# Patient Record
Sex: FEMALE | Race: WHITE | Employment: UNEMPLOYED | URBAN - METROPOLITAN AREA
[De-identification: names, ages, dates, MRNs, and addresses within clinical notes are randomized per-mention and may not be internally consistent; named-entity substitution may affect disease eponyms.]

---

## 2022-05-13 ENCOUNTER — HOSPITAL ENCOUNTER (EMERGENCY)
Age: 42
Discharge: HOME | End: 2022-05-13
Attending: EMERGENCY MEDICINE

## 2022-05-13 VITALS
OXYGEN SATURATION: 99 % | BODY MASS INDEX: 46.15 KG/M2 | WEIGHT: 277 LBS | DIASTOLIC BLOOD PRESSURE: 80 MMHG | HEIGHT: 65 IN | RESPIRATION RATE: 16 BRPM | HEART RATE: 76 BPM | SYSTOLIC BLOOD PRESSURE: 133 MMHG | TEMPERATURE: 98.1 F

## 2022-05-13 DIAGNOSIS — H81.392 PERIPHERAL VERTIGO INVOLVING LEFT EAR: Primary | ICD-10-CM

## 2022-05-13 PROCEDURE — 93005 ELECTROCARDIOGRAM TRACING: CPT | Performed by: EMERGENCY MEDICINE

## 2022-05-13 PROCEDURE — 99283 EMERGENCY DEPT VISIT LOW MDM: CPT

## 2022-05-13 PROCEDURE — 6370000000 HC RX 637 (ALT 250 FOR IP): Performed by: EMERGENCY MEDICINE

## 2022-05-13 RX ORDER — BUPROPION HYDROCHLORIDE 150 MG/1
150 TABLET, EXTENDED RELEASE ORAL 2 TIMES DAILY
COMMUNITY

## 2022-05-13 RX ORDER — METOCLOPRAMIDE 10 MG/1
10 TABLET ORAL ONCE
Status: COMPLETED | OUTPATIENT
Start: 2022-05-13 | End: 2022-05-13

## 2022-05-13 RX ORDER — METOCLOPRAMIDE 10 MG/1
10 TABLET ORAL 3 TIMES DAILY PRN
Qty: 40 TABLET | Refills: 0 | Status: SHIPPED | OUTPATIENT
Start: 2022-05-13

## 2022-05-13 RX ORDER — AMOXICILLIN AND CLAVULANATE POTASSIUM 875; 125 MG/1; MG/1
1 TABLET, FILM COATED ORAL 2 TIMES DAILY
Qty: 14 TABLET | Refills: 0 | Status: SHIPPED | OUTPATIENT
Start: 2022-05-13 | End: 2022-05-20

## 2022-05-13 RX ORDER — CETIRIZINE HYDROCHLORIDE 10 MG/1
10 TABLET ORAL DAILY
Qty: 30 TABLET | Refills: 0 | Status: SHIPPED | OUTPATIENT
Start: 2022-05-13 | End: 2022-06-12

## 2022-05-13 RX ORDER — FUROSEMIDE 20 MG/1
20 TABLET ORAL DAILY
COMMUNITY

## 2022-05-13 RX ORDER — LISINOPRIL AND HYDROCHLOROTHIAZIDE 25; 20 MG/1; MG/1
1 TABLET ORAL DAILY
COMMUNITY

## 2022-05-13 RX ORDER — LEVOTHYROXINE SODIUM 0.2 MG/1
300 TABLET ORAL DAILY
COMMUNITY

## 2022-05-13 RX ADMIN — METOCLOPRAMIDE 10 MG: 10 TABLET ORAL at 21:47

## 2022-05-13 ASSESSMENT — PAIN - FUNCTIONAL ASSESSMENT
PAIN_FUNCTIONAL_ASSESSMENT: NONE - DENIES PAIN
PAIN_FUNCTIONAL_ASSESSMENT: NONE - DENIES PAIN

## 2022-05-14 LAB
EKG ATRIAL RATE: 79 BPM
EKG DIAGNOSIS: NORMAL
EKG P AXIS: 40 DEGREES
EKG P-R INTERVAL: 182 MS
EKG Q-T INTERVAL: 392 MS
EKG QRS DURATION: 112 MS
EKG QTC CALCULATION (BAZETT): 449 MS
EKG R AXIS: 48 DEGREES
EKG T AXIS: 27 DEGREES
EKG VENTRICULAR RATE: 79 BPM

## 2022-05-14 PROCEDURE — 93010 ELECTROCARDIOGRAM REPORT: CPT | Performed by: INTERNAL MEDICINE

## 2022-05-14 NOTE — ED PROVIDER NOTES
mg by mouth 2 times daily    FUROSEMIDE (LASIX) 20 MG TABLET    Take 20 mg by mouth daily    LEVOTHYROXINE (SYNTHROID) 200 MCG TABLET    Take 300 mcg by mouth Daily    LISINOPRIL-HYDROCHLOROTHIAZIDE (PRINZIDE;ZESTORETIC) 20-25 MG PER TABLET    Take 1 tablet by mouth daily Indications: dose is 40/20    METFORMIN (GLUCOPHAGE) 1000 MG TABLET    Take 1,000 mg by mouth 2 times daily (with meals)       Social history:  reports that she has never smoked. She has never used smokeless tobacco. She reports that she does not drink alcohol and does not use drugs. Family history:  History reviewed. No pertinent family history. Exam  ED Triage Vitals [05/13/22 2020]   BP Temp Temp Source Pulse Resp SpO2 Height Weight   (!) 132/90 97.7 °F (36.5 °C) Oral 92 16 100 % 5' 5\" (1.651 m) 277 lb (125.6 kg)     Nursing note and vitals reviewed. Constitutional: In no acute distress  HENT:      Head: Normocephalic      Ears: External ears normal.  Small effusion behind left TM. No erythema or bulging. .     Nose: Nose normal.     Mouth: Membrane mucosa moist   Eyes: No discharge. Neck: Supple. Trachea midline. Cardiovascular: Regular rate. Warm extremities. Clear to auscultation bilaterally. No murmurs  Pulmonary/Chest: Effort normal. No respiratory distress. Abdominal: Soft. No distension. Nontender  : Deferred  Rectal: Deferred   Musculoskeletal: Moves all extremities. No gross deformity. Neurological: Alert and oriented. Face symmetric. Speech is clear. Cranial nerves II to XII intact. Normal finger-to-nose. Normal visual fields. No nystagmus. She does have strabismus. States that this is baseline for her, NIH stroke scale 0. Skin: Warm and dry. Psychiatric: Normal mood and affect. Behavior is normal.    Procedures      EKG  The Ekg interpreted by me in the absence of a cardiologist shows. Normal sinus rhythm. No specific ST changes appreciated.   HR 79  No prior EKG for comparison      Radiology  No orders to display       Labs  Results for orders placed or performed during the hospital encounter of 05/13/22   EKG 12 Lead   Result Value Ref Range    Ventricular Rate 79 BPM    Atrial Rate 79 BPM    P-R Interval 182 ms    QRS Duration 112 ms    Q-T Interval 392 ms    QTc Calculation (Bazett) 449 ms    P Axis 40 degrees    R Axis 48 degrees    T Axis 27 degrees    Diagnosis       Normal sinus rhythmNormal ECGNo previous ECGs available       Screenings   Markell Coma Scale  Eye Opening: Spontaneous  Best Verbal Response: Oriented  Best Motor Response: Obeys commands  Markell Coma Scale Score: 15       MDM and ED Course  This is a 40 y.o. female who presents to the ED for nausea, vomiting, dizziness, tinnitus in the left ear for 1 month. Likely secondary to peripheral vertigo/Ménière's disease. Unlikely central vertigo since symptoms are completely resolving between episodes. NIH stroke scale 0. Ambulating well at this time. Will give Reglan to assist with dizziness. Patient declines to have blood work performed. Discussed benefits and risks of this. doubt that this is cardiogenic with lack of chest discomfort, shortness of breath, or palpitations. Will obtain EKG. No abdominal tenderness or pain, distention, or abnormal bowel movements to indicate appendicitis, cholecystitis, bowel obstruction, gastroenteritis, ureteric calculus, torsion, pelvic inflammatory disease, pancreatitis. Has normal stools and no signs of anemia on my exam.     Since she has a small effusion behind left ear, will empirically treat with antibiotics. Will refer to ENT.         [unfilled]    Is this patient to be included in the SEP-1 Core Measure due to severe sepsis or septic shock? No   Exclusion criteria - the patient is NOT to be included for SEP-1 Core Measure due to: Infection is not suspected        Final Impression  1.  Peripheral vertigo involving left ear        Blood pressure (!) 132/90, pulse 92, temperature 97.7 °F (36.5 °C), temperature source Oral, resp. rate 16, height 5' 5\" (1.651 m), weight 277 lb (125.6 kg), last menstrual period 04/25/2022, SpO2 100 %. Disposition:  DISPOSITION        Patient Referrals: [address] [place] [address]  [phone]  Call in 1 day        Discharge Medications:  New Prescriptions    AMOXICILLIN-CLAVULANATE (AUGMENTIN) 875-125 MG PER TABLET    Take 1 tablet by mouth 2 times daily for 7 days    CETIRIZINE (ZYRTEC) 10 MG TABLET    Take 1 tablet by mouth daily    METOCLOPRAMIDE (REGLAN) 10 MG TABLET    Take 1 tablet by mouth 3 times daily as needed (nausea/dizziness)       Discontinued Medications:  Discontinued Medications    No medications on file       This chart was generated using the [address] [place] [address] [address] dictation system. I created this record but it may contain dictation errors given the limitations of this technology.         Liss Murray MD  05/13/22 7232

## 2022-07-28 ENCOUNTER — PROCEDURE VISIT (OUTPATIENT)
Dept: AUDIOLOGY | Age: 42
End: 2022-07-28

## 2022-07-28 ENCOUNTER — OFFICE VISIT (OUTPATIENT)
Dept: ENT CLINIC | Age: 42
End: 2022-07-28

## 2022-07-28 VITALS — WEIGHT: 265 LBS | HEIGHT: 65 IN | TEMPERATURE: 97 F | RESPIRATION RATE: 16 BRPM | BODY MASS INDEX: 44.15 KG/M2

## 2022-07-28 DIAGNOSIS — H93.12 TINNITUS, LEFT EAR: ICD-10-CM

## 2022-07-28 DIAGNOSIS — H90.42 SENSORINEURAL HEARING LOSS (SNHL) OF LEFT EAR WITH UNRESTRICTED HEARING OF RIGHT EAR: Primary | ICD-10-CM

## 2022-07-28 DIAGNOSIS — R42 VERTIGO: ICD-10-CM

## 2022-07-28 DIAGNOSIS — R42 DIZZINESS AND GIDDINESS: ICD-10-CM

## 2022-07-28 DIAGNOSIS — H90.3 ASNHL (ASYMMETRICAL SENSORINEURAL HEARING LOSS): Primary | ICD-10-CM

## 2022-07-28 PROCEDURE — 99204 OFFICE O/P NEW MOD 45 MIN: CPT | Performed by: STUDENT IN AN ORGANIZED HEALTH CARE EDUCATION/TRAINING PROGRAM

## 2022-07-28 PROCEDURE — 92557 COMPREHENSIVE HEARING TEST: CPT | Performed by: AUDIOLOGIST

## 2022-07-28 PROCEDURE — 69801 INCISE INNER EAR: CPT | Performed by: STUDENT IN AN ORGANIZED HEALTH CARE EDUCATION/TRAINING PROGRAM

## 2022-07-28 PROCEDURE — 92567 TYMPANOMETRY: CPT | Performed by: AUDIOLOGIST

## 2022-07-28 RX ORDER — DEXAMETHASONE SODIUM PHOSPHATE 100 MG/10ML
10 INJECTION INTRAMUSCULAR; INTRAVENOUS ONCE
Status: COMPLETED | OUTPATIENT
Start: 2022-07-28 | End: 2022-07-28

## 2022-07-28 RX ORDER — GLIPIZIDE 10 MG/1
10 TABLET ORAL
COMMUNITY

## 2022-07-28 RX ORDER — ASPIRIN 81 MG/1
81 TABLET ORAL DAILY
COMMUNITY

## 2022-07-28 RX ADMIN — DEXAMETHASONE SODIUM PHOSPHATE 10 MG: 100 INJECTION INTRAMUSCULAR; INTRAVENOUS at 15:43

## 2022-07-28 ASSESSMENT — ENCOUNTER SYMPTOMS
NAUSEA: 0
COUGH: 0
VOMITING: 0
SHORTNESS OF BREATH: 0
EYE PAIN: 0
RHINORRHEA: 0

## 2022-07-28 NOTE — PATIENT INSTRUCTIONS
Good Communication Strategies    Communication can be challenging for anyone, but can be especially difficult for those with some degree of hearing loss. While we may not be able to control every factor that may lead to difficulty with communication, there are Good Communication Strategies that we can all use in our day-to-day lives. Communication takes both parties working together for it to be successful. Tips as a Listener:   Control your environment. It is important to limit the amount of background noise in the room when possible. You should also consider having a good light source in the room to best see the other person. Ask for clarification. Instead of saying \"What?\", you can use parts of what you heard to make a new question. For example, if you heard the word \"Thursday\" but not the rest of the week, you may ask \"What was that about Thursday? \" or \"What did you want to do Thursday? \". This shows the person talking that you are listening and will help them better explain what they are saying. Be an advocate for yourself. If you are hearing but not understanding, tell the other person \"I can hear you, but I need you to slow down when you speak. \"  Or if someone is facing the other direction, say \"I cannot hear you when you are not looking at me when we talk. \"       Tips as a Talker:   - Sit or stand 3 to 6 feet away to maximize audibility         -- It is unrealistic to believe someone else will fully hear your message if you are speaking from across the room or in a different room in the house   - Stay at eye level to help with visual cues   - Make sure you have the persons attention before speaking   - Use facial expressions and gestures to accentuate your message   - Raise your voice slightly (do not scream)   - Speak slowly and distinctly   - Use short, simple sentences   - Rephrase your words if the person is having a hard time understanding you    - To avoid distortion, dont speak directly into a persons ear      Some additional items that may be helpful:   - Remain patient - this is important for both parties   - Write down items that still cannot be heard/understood. You may write with pen/paper or consider typing/texting on a cell phone or smart device. - If background noise is unavoidable, try to keep yourself in a good position in the room. By sitting at a card on the side of the restaurant (preferably a corner), it will be easier to communicate than if you were sitting at a table in the middle with background noise surrounding you. Keep yourself positioned away from music speakers or heavy foot traffic. Tinnitus: Overview and Management Strategies          Many people have some ringing sounds in their ears once in a while. You may hear a roar, a hiss, a tinkle, or a buzz. The sound usually lasts only a few minutes. If it goes on all the time, you may have tinnitus. Tinnitus is usually caused by long-term exposure to loud noise. This damages the nerves in the inner ear. It can occur with all types of hearing loss. It may be a symptom of almost any ear problem. Tinnitus may be caused by a buildup of earwax. Or, it may be caused by ear infections or certain medicines (especially antibiotics or large amounts of aspirin). You can also hear noises in your ears because of an injury to the ears, drinking too much alcohol or caffeine, or a medical condition. Other conditions may also contribute to tinnitus, including: head and neck trauma, temporomandibular joint disorder (TMJ), sinus pressure and barometric trauma, traumatic brain injury, metabolic disorders, autoimmune disorders, stress, and high blood pressure. You may need tests to evaluate your hearing and to find causes of long-lasting tinnitus. Your doctor may suggest one or more treatments to help you cope with the tinnitus. You can also do things at home to help reduce symptoms.     Follow-up care is a key part of your treatment and safety. Be sure to make and go to all appointments, and call your doctor if you are having problems. It's also a good idea to know your test results and keep a list of the medicines you take. How can you care for yourself at home? Limit or cut out alcohol, caffeine, and sodium. They can make your symptoms worse. Do not smoke or use other tobacco products. Nicotine reduces blood flow to the ear and makes tinnitus worse. If you need help quitting, talk to your doctor about stop-smoking programs and medicines. These can increase your chances of quitting for good. Talk to your doctor about whether to stop taking aspirin and similar products such as ibuprofen or naproxen. Get exercise often. It can help improve blood flow to the ear. Ways to manage/cope with tinnitus  Some tinnitus may last a long time. To manage your tinnitus, try to: Avoid noises that you think caused your tinnitus. If you can't avoid loud noises, wear earplugs or earmuffs. Ignore the sound by paying attention to other things. Keeping your brain busy with other tasks or background noise can help your brain not focus on the tinnitus. Try to not give the tinnitus an emotional reaction. Do your best to ignore the sound and not let it bother you. Relax using biofeedback, meditation, or yoga. Feeling stressed and being tired can make tinnitus worse. Play music or white noise to help you sleep. Background noise may cover up the noise that you hear in your ears. You can buy a tabletop machine or a device that sits under your pillow to play soothing sounds, like ocean waves. Smart phones have free apps, such as Whist, Relax Melodies, ReSound Relief, and [employer]. These apps have different types of sounds/noise, some of which you can blend together to find sounds that are most soothing to you.   Hearing aid technology, especially when there is some hearing loss, may help reduce tinnitus symptoms by giving your being around other loud machines. Hearing loss can affect your work and home life. It can make you feel lonely or depressed. You may feel that you have lost your independence. But hearing aids and other devices can help you hear better and feel connected to others. Follow-up care is a key part of your treatment and safety. Be sure to make and go to all appointments, and call your doctor if you are having problems. It's also a good idea to know your test results and keep a list of the medicines you take. How can you care for yourself at home? Avoid loud noises whenever possible. This helps keep your hearing from getting worse. Always wear hearing protection around loud noises. If appropriate, wear hearing aid(s) as directed. It is recommended that hearing aids are worn during all waking hours to keep your brain active and give it access to the sounds it is missing. If you are beginning your process with hearing aid(s), schedule a \"Hearing Aid Evaluation\" with an audiologist to discuss your lifestyle, features of hearing aid technology, and styles of hearing aids available. It is recommended that you contact your insurance company to determine if you have a hearing aid benefit, as this may dictate who you can see for these services. Have hearing tests as your doctor suggests. They can show whether your hearing has changed. Your hearing aid may need to be adjusted. Use other assistive devices as needed. These may include:  Telephone amplifiers and hearing aids that can connect to a television, stereo, radio, or microphone. Devices that use lights or vibrations. These alert you to the doorbell, a ringing telephone, or a baby monitor. Television closed-captioning. This shows the words at the bottom of the screen. Most new TVs can do this. TTY (text telephone). This lets you type messages back and forth on the telephone instead of talking or listening. These devices are also called TDD.  When messages are typed on the keyboard, they are sent over the phone line to a receiving TTY. The message is shown on a monitor. Use pagers, fax machines, text, and email if it is hard for you to communicate by telephone. Try to learn a listening technique called speech-reading. It is not lip-reading. You pay attention to people's gestures, expressions, posture, and tone of voice. These clues can help you understand what a person is saying. Face the person you are talking to, and have him or her face you. Make sure the lighting is good. You need to see the other person's face clearly. Think about counseling if you need help to adjust to your hearing loss. When should you call for help? Watch closely for changes in your health, and be sure to contact your doctor if:    You think your hearing is getting worse. You have new symptoms, such as dizziness or nausea. Dizziness: Care Instructions  Your Care Instructions  Dizziness is the feeling of unsteadiness or fuzziness in your head. It is different than having vertigo, which is a feeling that the room is spinning or that you are moving or falling. It is also different from lightheadedness, which is the feeling that you are about to faint. It can be hard to know what causes dizziness. Some people feel dizzy when they have migraine headaches. Sometimes bouts of flu can make you feel dizzy. Some medical conditions, such as heart problems or high blood pressure, can make you feel dizzy. Many medicines can cause dizziness, including medicines for high blood pressure, pain, or anxiety. If a medicine causes your symptoms, your doctor may recommend that you stop or change the medicine. If it is a problem with your heart, you may need medicine to help your heart work better. If there is no clear reason for your symptoms, your doctor may suggest watching and waiting for a while to see if the dizziness goes away on its own.     Follow-up care is a key part of your treatment and safety. Be sure to make and go to all appointments, and call your doctor if you are having problems. It's also a good idea to know your test results and keep a list of the medicines you take. How can you care for yourself at home? If your doctor recommends or prescribes medicine, take it exactly as directed. Call your doctor if you think you are having a problem with your medicine. Do not drive while you feel dizzy. Try to prevent falls. Steps you can take include:  Using nonskid mats, adding grab bars near the tub, and using night-lights. Clearing your home so that walkways are free of anything you might trip on. Letting family and friends know that you have been feeling dizzy. This will help them know how to help you. When should you call for help? Call 911 anytime you think you may need emergency care. For example, call if:    You passed out (lost consciousness). You have dizziness along with symptoms of a heart attack. These may include:  Chest pain or pressure, or a strange feeling in the chest.  Sweating. Shortness of breath. Nausea or vomiting. Pain, pressure, or a strange feeling in the back, neck, jaw, or upper belly or in one or both shoulders or arms. Lightheadedness or sudden weakness. A fast or irregular heartbeat. You have symptoms of a stroke. These may include:  Sudden numbness, tingling, weakness, or loss of movement in your face, arm, or leg, especially on only one side of your body. Sudden vision changes. Sudden trouble speaking. Sudden confusion or trouble understanding simple statements. Sudden problems with walking or balance. A sudden, severe headache that is different from past headaches. Call your doctor now or seek immediate medical care if:    You feel dizzy and have a fever, headache, or ringing in your ears. You have new or increased nausea and vomiting. Your dizziness does not go away or comes back.     Watch closely for changes in your health, and be sure to contact your doctor if:    You do not get better as expected. Where can you learn more? Go to [email or web address] and sign in to your [employer] account. Enter F554 in the [facility] box to learn more about \"Dizziness: Care Instructions. \"     If you do not have an account, please click on the \"Sign Up Now\" link. Current as of: September 23, 2018  Content Version: 11.9  © 6582-2956 [employer]. Care instructions adapted under license by [facility] ([facility]). If you have questions about a medical condition or this instruction, always ask your healthcare professional. [address] any warranty or liability for your use of this information.

## 2022-07-28 NOTE — Clinical Note
Dr. Rekha Allen,  Thank you for your referral for audiometric testing on this patient. Please see the scanned audiogram (under \"Media\" tab) and encounter note for details. If you have any questions, or if there is anything else you need, please let me know.    Sancho Laguerre Audiologist --- [address] [facility] - Audiology

## 2022-07-28 NOTE — PROGRESS NOTES
Pedro Simmons   1980, 40 y.o. female   [id number]       Referring Provider: Nayana Page MD  Referral Type: In an order in [address] [place] [address]    Reason for Visit: Evaluation of suspected change in hearing, tinnitus, or balance. ADULT AUDIOLOGIC EVALUATION      Lola Bazan is a 40 y.o. female seen today, 7/28/2022 , for an initial audiologic evaluation. Patient was seen by Nayana Page MD following today's evaluation. Patient was accompanied by her . AUDIOLOGIC AND OTHER PERTINENT MEDICAL HISTORY:      Pedro Simmons noted episodic dizziness described as room-spinning for the past several months with Emergency Department (ED) visit for dizziness on 5/13/22. She states initial episodes lasted several hours at a time and now have shortened in duration. Patient reports decreased hearing and tinnitus in the left ear for the past ~ 6 months. No additional significant otologic or medical history was reported. Pedro Simmons denied otalgia, otorrhea, history of occupational/recreational noise exposure, history of head trauma, history of ear surgery, and family history of hearing loss. Date: 7/28/2022     IMPRESSIONS:      Today's results revealed an asymmetric sensorineural hearing loss with excellent word recognition, bilaterally. Hearing loss significant enough to create hearing difficulty in at least some listening situations. Discussed benefits of amplification. Follow medical recommendations of Nayana Page MD.     ASSESSMENT AND FINDINGS:     Otoscopy revealed: Clear ear canals bilaterally    RIGHT EAR:  Hearing Sensitivity:Hearing sensitivity within normal limits from 250-8000 Hz  Speech Recognition Threshold: 10 dB HL  Word Recognition: Excellent (100%), based on NU-6 25-word list at 55 dBHL using recorded speech stimuli. Tympanometry: Normal peak pressure and compliance, Type A tympanogram, consistent with normal middle ear function.   Acoustic Reflexes: Ipsilateral: Could not maintain seal. Contralateral: Could not maintain seal.      LEFT EAR:  Hearing Sensitivity: Moderately-severe to severe sensorineural hearing loss from 250-750Hz rising to mild to moderate sensorineural hearing loss through [facility] [place]. Speech Recognition Threshold: 10 dB HL  Word Recognition: Very Poor (56%), based on NU-6 25-word list at 85 dBHL masked using recorded speech stimuli. Tympanometry: Normal peak pressure with low compliance, Type As tympanogram, consistent with reduced tympanic membrane mobility. Acoustic Reflexes: Ipsilateral: Did not test. Contralateral: Did not test.      Reliability: Good   Transducer: Inserts    See scanned audiogram dated 7/28/2022  for results. PATIENT EDUCATION:       The following items were discussed with the patient:   - Good Communication Strategies  - Hearing Loss and Hearing Aids  - Tinnitus Management Strategies    - Dizziness    Educational information was shared in the After Visit Summary. RECOMMENDATIONS:                                                                                                                                                                                                                                                            The following items are recommended based on patient report and results from today's appointment:   - Continue medical follow-up with Payton Pierce DO.   - Retest hearing as medically indicated and/or sooner if a change in hearing is noted. - Utilize \"Good Communication Strategies\" as discussed to assist in speech understanding with communication partners. - Maintain a sound enriched environment to assist in the management of tinnitus symptoms. - If medically indicated, consider vestibular evaluation to further investigate symptoms of dizziness.        Kody Kingsley  Audiologist    Chart CC'd to: Payton Pierce DO      Degree of   Hearing Sensitivity dB Range Within Normal Limits (WNL) 0 - 20   Mild 20 - 40   Moderate 40 - 55   Moderately-Severe 55 - 70   Severe 70 - 90   Profound 90 +

## 2022-07-28 NOTE — PROGRESS NOTES
[facility]      Patient Name: Chandler Alvares  Medical Record Number:  [id number]  Primary Care Physician:  No primary care provider on file. Date of Consultation: 7/28/2022    Chief Complaint:   Chief Complaint   Patient presents with    New Patient     Dizzy for 5 to 6 months- Had been to PCP and was not helped by those visits        [address] P[address]es is a(n) 40 y.o. female who presents for evaluation of hearing loss and vertigo. She states that this has been going on for 5 to 6 months. The hearing and dizziness both can have began out of nowhere. The dizziness is slightly improved over the last several days and is only occurring every 2 to 3 days. She has a sense of motion that can last for minutes to hours. She denies any sensitivity to salt. She denies any family history of Ménière's disease. She never had any ear surgeries. She had any recent head neck imaging      There is no problem list on file for this patient. No past surgical history on file. No family history on file.   Social History     Socioeconomic History    Marital status: Single     Spouse name: Not on file    Number of children: Not on file    Years of education: Not on file    Highest education level: Not on file   Occupational History    Not on file   Tobacco Use    Smoking status: Every Day     Packs/day: 0.50     Types: Cigarettes    Smokeless tobacco: Never   Vaping Use    Vaping Use: Never used   Substance and Sexual Activity    Alcohol use: Never    Drug use: Never    Sexual activity: Not on file   Other Topics Concern    Not on file   Social History Narrative    Not on file     Social Determinants of Health     Financial Resource Strain: Not on file   Food Insecurity: Not on file   Transportation Needs: Not on file   Physical Activity: Not on file   Stress: Not on file   Social Connections: Not on file   Intimate Partner Violence: Not on file Housing Stability: Not on file       DRUG/FOOD ALLERGIES: Patient has no known allergies. CURRENT MEDICATIONS  Prior to Admission medications    Medication Sig Start Date End Date Taking? Authorizing Provider   glipiZIDE (GLUCOTROL) 10 MG tablet Take 10 mg by mouth in the morning and 10 mg in the evening. Take before meals. Yes Historical Provider, MD   aspirin 81 MG EC tablet Take 81 mg by mouth in the morning. Yes Historical Provider, MD   metFORMIN (GLUCOPHAGE) 1000 MG tablet Take 1,000 mg by mouth 2 times daily (with meals)   Yes Historical Provider, MD   levothyroxine (SYNTHROID) 200 MCG tablet Take 300 mcg by mouth Daily   Yes Historical Provider, MD   lisinopril-hydroCHLOROthiazide (PRINZIDE;ZESTORETIC) 20-25 MG per tablet Take 1 tablet by mouth daily Indications: dose is 40/20   Yes Historical Provider, MD   furosemide (LASIX) 20 MG tablet Take 20 mg by mouth daily   Yes Historical Provider, MD   metoclopramide (REGLAN) 10 MG tablet Take 1 tablet by mouth 3 times daily as needed (nausea/dizziness) 5/13/22  Yes Casandra Nelson MD   buPROPion [facility] SR) 150 MG extended release tablet Take 150 mg by mouth 2 times daily  Patient not taking: Reported on 7/28/2022    Historical Provider, MD       REVIEW OF SYSTEMS  The following systems were reviewed and revealed the following in addition to any already discussed in the HPI:    Review of Systems   Constitutional:  Negative for fatigue and fever. HENT:  Positive for hearing loss. Negative for congestion, ear pain, postnasal drip, rhinorrhea and sneezing. Eyes:  Negative for pain and visual disturbance. Respiratory:  Negative for cough and shortness of breath. Cardiovascular:  Negative for chest pain. Gastrointestinal:  Negative for nausea and vomiting. Endocrine: Negative. Genitourinary: Negative. Musculoskeletal:  Negative for neck pain and neck stiffness. Skin:  Negative for rash. Neurological:  Positive for dizziness.  Negative for headaches. PHYSICAL EXAM  Temp 97 °F (36.1 °C) (Infrared)   Resp 16   Ht 5' 5\" (1.651 m)   Wt 265 lb (120.2 kg)   BMI 44.10 kg/m²     GENERAL: No Acute Distress, Alert and Oriented, no hoarseness  EYES: EOMI, Anti-icteric  NOSE: No epistaxis, nasal mucosa within normal limits, no purulent drainage  EARS: Normal external canal appearance, EAC patent bilaterally  FACE: 1/6 House-Brackmann Scale, symmetric, sensation equal bilaterally  ORAL CAVITY: No masses or lesions palpated, uvula is midline, moist mucous membranes,   NECK: Normal range of motion, no thyromegaly, trachea is midline, no lymphadenopathy, no neck masses, no crepitus  CHEST: Normal respiratory effort, no retractions, breathing comfortably  SKIN: No rashes, normal appearing skin, no evidence of skin lesions/tumors    RADIOLOGY  Summary of findings:      PROCEDURE  Steroid injection into left middle ear    Pre op Dx: asymmetric sensorineural hearing loss  Post Op: Same  Procedure: left-sided transtympanic steroid injection  Consent: written obtained  Surgeon: Sierra Oliva DO  Description:  With the use of an operating microscopy and a speculum, the external auditory canals were examined bilaterally. Any cerumen was removed using instrumentation. The tympanic membranes were visualize bilaterally, revealing intact left-sided TM. Topical phenol was applied to the superior posterior quadrant of left-sided tympanic membrane(s). A myringotomy knife was used to make a radial incision in the tympanic membrane. a 27-gauge needle was used to perforate the TM and dexamethasone was used to fill the middle ear cavity. A cotton ball was placed in the ear canal. The patient tolerated the procedure well and remained in the recumbent position with the left ear up for 10 minutes. There were no complications with the procedure. See scanned audiogram dated 7/28/2022  for results.             ASSESSMENT/PLAN  Rolanda Sloan is a very pleasant 40 y.o. female with 1. ASNHL (asymmetrical sensorineural hearing loss)  Transtympanic steroid injection was performed of the left ear and will obtain an MRI.  - MRI IAC POSTERIOR FOSSA W WO CONTRAST; Future  - DC LABYRINTHOTOMY W PERFUSION VESTIBULOACTIVE DRUGS,TRANSCRANIAL    2. Vertigo  I will send her to vestibular therapy for her dizziness. - Matthew Bee, Audiology, [place]  - PT vestibular rehab; Future    She will follow-up in 1 week for repeat hearing test.    Medical Decision Making:   The following items were considered in medical decision making:  Independent review of images  Review / order clinical lab tests  Review / order radiology tests  Decision to obtain old records

## 2022-08-07 NOTE — PROGRESS NOTES
through [facility] sloping to a moderate hearing loss at [facility]. Speech Recognition Threshold: 45 dB HL  Word Recognition: Fair (72%), based on NU-6 25-word list at 85 dBHL masked using recorded speech stimuli. Tympanometry: Flat, no peak pressure or compliance, Type B tympanogram, with typical ear canal volume, consistent with middle ear fluid. Reliability: Good   Transducer: Inserts    See scanned audiogram dated 8/9/2022  for results. PATIENT EDUCATION:       The following items were discussed with the patient:   - Good Communication Strategies  - Hearing Loss and Hearing Aids  - Tinnitus Management Strategies    - Dizziness    Educational information was shared in the After Visit Summary. RECOMMENDATIONS:                                                                                                                                                                                                                                                            The following items are recommended based on patient report and results from today's appointment:   - Continue medical follow-up with Sierra Oliva DO.   - Retest hearing as medically indicated and/or sooner if a change in hearing is noted. - Utilize \"Good Communication Strategies\" as discussed to assist in speech understanding with communication partners. - Maintain a sound enriched environment to assist in the management of tinnitus symptoms. - If medically indicated, consider vestibular evaluation to further investigate symptoms of dizziness.        Brandi Kingsley  Audiologist    Chart CC'd to: Sierra Oliva DO      Degree of   Hearing Sensitivity dB Range   Within Normal Limits (WNL) 0 - 20   Mild 20 - 40   Moderate 40 - 55   Moderately-Severe 55 - 70   Severe 70 - 90   Profound 90 +

## 2022-08-09 ENCOUNTER — OFFICE VISIT (OUTPATIENT)
Dept: ENT CLINIC | Age: 42
End: 2022-08-09

## 2022-08-09 ENCOUNTER — PROCEDURE VISIT (OUTPATIENT)
Dept: AUDIOLOGY | Age: 42
End: 2022-08-09

## 2022-08-09 VITALS — TEMPERATURE: 97.2 F | HEIGHT: 65 IN | WEIGHT: 260 LBS | BODY MASS INDEX: 43.32 KG/M2

## 2022-08-09 DIAGNOSIS — H90.3 ASNHL (ASYMMETRICAL SENSORINEURAL HEARING LOSS): Primary | ICD-10-CM

## 2022-08-09 DIAGNOSIS — H90.42 SENSORINEURAL HEARING LOSS (SNHL) OF LEFT EAR WITH UNRESTRICTED HEARING OF RIGHT EAR: Primary | ICD-10-CM

## 2022-08-09 DIAGNOSIS — R42 VERTIGO: ICD-10-CM

## 2022-08-09 DIAGNOSIS — R42 DIZZINESS AND GIDDINESS: ICD-10-CM

## 2022-08-09 DIAGNOSIS — H93.12 TINNITUS, LEFT EAR: ICD-10-CM

## 2022-08-09 PROCEDURE — 92557 COMPREHENSIVE HEARING TEST: CPT | Performed by: AUDIOLOGIST

## 2022-08-09 PROCEDURE — 92567 TYMPANOMETRY: CPT | Performed by: AUDIOLOGIST

## 2022-08-09 PROCEDURE — 99214 OFFICE O/P EST MOD 30 MIN: CPT | Performed by: STUDENT IN AN ORGANIZED HEALTH CARE EDUCATION/TRAINING PROGRAM

## 2022-08-09 ASSESSMENT — ENCOUNTER SYMPTOMS
EYE PAIN: 0
RHINORRHEA: 0
COUGH: 0
NAUSEA: 0
SHORTNESS OF BREATH: 0
VOMITING: 0

## 2022-08-09 NOTE — Clinical Note
Dr. Gleason Breeding,  Thank you for your referral for audiometric testing on this patient. Please see the scanned audiogram (under \"Media\" tab) and encounter note for details. If you have any questions, or if there is anything else you need, please let me know.    Sancho Osborne Audiologist --- [address] [facility] - Audiology

## 2022-08-09 NOTE — PROGRESS NOTES
[facility]      Patient Name: Rosio Tan  Medical Record Number:  [id number]  Primary Care Physician:  No primary care provider on file. Date of Consultation: 8/9/2022    Chief Complaint:   Chief Complaint   Patient presents with    Follow-up     Patient states that her hearing has not improved        [facility] Jacey is a(n) 40 y.o. female who presents for evaluation of hearing loss and vertigo. She states that this has been going on for 5 to 6 months. The hearing and dizziness both can have began out of nowhere. The dizziness is slightly improved over the last several days and is only occurring every 2 to 3 days. She has a sense of motion that can last for minutes to hours. She denies any sensitivity to salt. She denies any family history of Ménière's disease. She never had any ear surgeries. She had any recent head neck imaging    Interval History 8/9/2022  Rosie felt that the hearing improved for a day or 2 after the injection was performed. It has since returned to normal.  She has not had her MRI performed yet. There is no problem list on file for this patient. History reviewed. No pertinent surgical history. History reviewed. No pertinent family history.   Social History     Socioeconomic History    Marital status: Single     Spouse name: Not on file    Number of children: Not on file    Years of education: Not on file    Highest education level: Not on file   Occupational History    Not on file   Tobacco Use    Smoking status: Every Day     Packs/day: 0.50     Types: Cigarettes    Smokeless tobacco: Never   Vaping Use    Vaping Use: Never used   Substance and Sexual Activity    Alcohol use: Never    Drug use: Never    Sexual activity: Not on file   Other Topics Concern    Not on file   Social History Narrative    Not on file     Social Determinants of Health     Financial Resource Strain: Not on file Food Insecurity: Not on file   Transportation Needs: Not on file   Physical Activity: Not on file   Stress: Not on file   Social Connections: Not on file   Intimate Partner Violence: Not on file   Housing Stability: Not on file       DRUG/FOOD ALLERGIES: Amlodipine and Hydrocodone-acetaminophen    CURRENT MEDICATIONS  Prior to Admission medications    Medication Sig Start Date End Date Taking? Authorizing Provider   glipiZIDE (GLUCOTROL) 10 MG tablet Take 10 mg by mouth in the morning and 10 mg in the evening. Take before meals. Yes Historical Provider, MD   aspirin 81 MG EC tablet Take 81 mg by mouth in the morning. Yes Historical Provider, MD   metFORMIN (GLUCOPHAGE) 1000 MG tablet Take 1,000 mg by mouth 2 times daily (with meals)   Yes Historical Provider, MD   levothyroxine (SYNTHROID) 200 MCG tablet Take 300 mcg by mouth Daily   Yes Historical Provider, MD   lisinopril-hydroCHLOROthiazide (PRINZIDE;ZESTORETIC) 20-25 MG per tablet Take 1 tablet by mouth daily Indications: dose is 40/20   Yes Historical Provider, MD   furosemide (LASIX) 20 MG tablet Take 20 mg by mouth daily   Yes Historical Provider, MD   buPROPion (WELLBUTRIN SR) 150 MG extended release tablet Take 150 mg by mouth in the morning and 150 mg before bedtime. Yes Historical Provider, MD   metoclopramide (REGLAN) 10 MG tablet Take 1 tablet by mouth 3 times daily as needed (nausea/dizziness) 5/13/22  Yes Casandra Nelson MD       REVIEW OF SYSTEMS  The following systems were reviewed and revealed the following in addition to any already discussed in the HPI:    Review of Systems   Constitutional:  Negative for fatigue and fever. HENT:  Positive for hearing loss. Negative for congestion, ear pain, postnasal drip, rhinorrhea and sneezing. Eyes:  Negative for pain and visual disturbance. Respiratory:  Negative for cough and shortness of breath. Cardiovascular:  Negative for chest pain.    Gastrointestinal:  Negative for nausea and vomiting. Endocrine: Negative. Genitourinary: Negative. Musculoskeletal:  Negative for neck pain and neck stiffness. Skin:  Negative for rash. Neurological:  Negative for dizziness and headaches. PHYSICAL EXAM  Temp 97.2 °F (36.2 °C)   Ht 5' 5\" (1.651 m)   Wt 260 lb (117.9 kg)   BMI 43.27 kg/m²     GENERAL: No Acute Distress, Alert and Oriented, no hoarseness  EYES: EOMI, Anti-icteric  NOSE: No epistaxis, nasal mucosa within normal limits, no purulent drainage  EARS: Normal external canal appearance, EAC patent bilaterally, TMs intact bilaterally with no evidence of effusions  FACE: 1/6 House-Brackmann Scale, symmetric, sensation equal bilaterally  ORAL CAVITY: No masses or lesions palpated, uvula is midline, moist mucous membranes,   NECK: Normal range of motion, no thyromegaly, trachea is midline, no lymphadenopathy, no neck masses, no crepitus  CHEST: Normal respiratory effort, no retractions, breathing comfortably  SKIN: No rashes, normal appearing skin, no evidence of skin lesions/tumors    RADIOLOGY  Summary of findings:      PROCEDURE  See scanned audiogram dated 8/9/2022  for results. ASSESSMENT/PLAN  Dario Jean is a very pleasant 40 y.o. female with     1. ASNHL (asymmetrical sensorineural hearing loss)  She did not have significant improvement in her hearing after the transtympanic steroid injection. She did have mild increase in her word recognition scores. She would be a candidate for hearing aids on the left side. She needs to obtain an MRI and will likely wait until October when she has insurance coverage. 2. Vertigo  Vertigo has improved    She will follow-up after MRI has been completed in October. Medical Decision Making:   The following items were considered in medical decision making:  Independent review of images  Review / order clinical lab tests  Review / order radiology tests  Decision to obtain old records

## 2022-08-09 NOTE — PATIENT INSTRUCTIONS
Good Communication Strategies    Communication can be challenging for anyone, but can be especially difficult for those with some degree of hearing loss. While we may not be able to control every factor that may lead to difficulty with communication, there are Good Communication Strategies that we can all use in our day-to-day lives. Communication takes both parties working together for it to be successful. Tips as a Listener:   Control your environment. It is important to limit the amount of background noise in the room when possible. You should also consider having a good light source in the room to best see the other person. Ask for clarification. Instead of saying \"What?\", you can use parts of what you heard to make a new question. For example, if you heard the word \"Thursday\" but not the rest of the week, you may ask \"What was that about Thursday? \" or \"What did you want to do Thursday? \". This shows the person talking that you are listening and will help them better explain what they are saying. Be an advocate for yourself. If you are hearing but not understanding, tell the other person \"I can hear you, but I need you to slow down when you speak. \"  Or if someone is facing the other direction, say \"I cannot hear you when you are not looking at me when we talk. \"       Tips as a Talker:   - Sit or stand 3 to 6 feet away to maximize audibility         -- It is unrealistic to believe someone else will fully hear your message if you are speaking from across the room or in a different room in the house   - Stay at eye level to help with visual cues   - Make sure you have the persons attention before speaking   - Use facial expressions and gestures to accentuate your message   - Raise your voice slightly (do not scream)   - Speak slowly and distinctly   - Use short, simple sentences   - Rephrase your words if the person is having a hard time understanding you    - To avoid distortion, dont speak directly into a persons ear      Some additional items that may be helpful:   - Remain patient - this is important for both parties   - Write down items that still cannot be heard/understood. You may write with pen/paper or consider typing/texting on a cell phone or smart device. - If background noise is unavoidable, try to keep yourself in a good position in the room. By sitting at a card on the side of the restaurant (preferably a corner), it will be easier to communicate than if you were sitting at a table in the middle with background noise surrounding you. Keep yourself positioned away from music speakers or heavy foot traffic. Tinnitus: Overview and Management Strategies          Many people have some ringing sounds in their ears once in a while. You may hear a roar, a hiss, a tinkle, or a buzz. The sound usually lasts only a few minutes. If it goes on all the time, you may have tinnitus. Tinnitus is usually caused by long-term exposure to loud noise. This damages the nerves in the inner ear. It can occur with all types of hearing loss. It may be a symptom of almost any ear problem. Tinnitus may be caused by a buildup of earwax. Or, it may be caused by ear infections or certain medicines (especially antibiotics or large amounts of aspirin). You can also hear noises in your ears because of an injury to the ears, drinking too much alcohol or caffeine, or a medical condition. Other conditions may also contribute to tinnitus, including: head and neck trauma, temporomandibular joint disorder (TMJ), sinus pressure and barometric trauma, traumatic brain injury, metabolic disorders, autoimmune disorders, stress, and high blood pressure. You may need tests to evaluate your hearing and to find causes of long-lasting tinnitus. Your doctor may suggest one or more treatments to help you cope with the tinnitus. You can also do things at home to help reduce symptoms.     Follow-up care is a key part of your treatment and safety. Be sure to make and go to all appointments, and call your doctor if you are having problems. It's also a good idea to know your test results and keep a list of the medicines you take. How can you care for yourself at home? Limit or cut out alcohol, caffeine, and sodium. They can make your symptoms worse. Do not smoke or use other tobacco products. Nicotine reduces blood flow to the ear and makes tinnitus worse. If you need help quitting, talk to your doctor about stop-smoking programs and medicines. These can increase your chances of quitting for good. Talk to your doctor about whether to stop taking aspirin and similar products such as ibuprofen or naproxen. Get exercise often. It can help improve blood flow to the ear. Ways to manage/cope with tinnitus  Some tinnitus may last a long time. To manage your tinnitus, try to: Avoid noises that you think caused your tinnitus. If you can't avoid loud noises, wear earplugs or earmuffs. Ignore the sound by paying attention to other things. Keeping your brain busy with other tasks or background noise can help your brain not focus on the tinnitus. Try to not give the tinnitus an emotional reaction. Do your best to ignore the sound and not let it bother you. Relax using biofeedback, meditation, or yoga. Feeling stressed and being tired can make tinnitus worse. Play music or white noise to help you sleep. Background noise may cover up the noise that you hear in your ears. You can buy a tabletop machine or a device that sits under your pillow to play soothing sounds, like ocean waves. Smart phones have free apps, such as Whist, Relax Melodies, ReSound Relief, and [employer]. These apps have different types of sounds/noise, some of which you can blend together to find sounds that are most soothing to you.   Hearing aid technology, especially when there is some hearing loss, may help reduce tinnitus symptoms by giving your brain better access to the sounds it is missing. There are some hearing aids with built-in noise generator programs, which may help when amplification alone is not enough. Additional resources may be found through the [employer]inn[employer] Association at www.rhys.org    When should you call for help? Call 911 anytime you think you may need emergency care. For example, call if:    You have symptoms of a stroke. These may include:  Sudden numbness, tingling, weakness, or loss of movement in your face, arm, or leg, especially on only one side of your body. Sudden vision changes. Sudden trouble speaking. Sudden confusion or trouble understanding simple statements. Sudden problems with walking or balance. A sudden, severe headache that is different from past headaches. Call your doctor now or seek immediate medical care if:    You develop other symptoms. These may include hearing loss (or worse hearing loss), balance problems, dizziness, nausea, or vomiting. Watch closely for changes in your health, and be sure to contact your doctor if:    Your tinnitus moves from both ears to one ear. Your hearing loss gets worse within 1 day after an ear injury. Your tinnitus or hearing loss does not get better within 1 week after an ear injury. Your tinnitus bothers you enough that you want to take medicines to help you cope with it. If you notice changes in your tinnitus and/or your hearing, it is recommended that you have your hearing tested by your audiologist and to follow-up with your physician that manages your hearing loss (such as your ENT or Primary Care doctor). Hearing Loss: Care Instructions  Your Care Instructions      Hearing loss is a sudden or slow decrease in how well you hear. It can range from mild to profound. Permanent hearing loss can occur with aging, and it can happen when you are exposed long-term to loud noise.  Examples include listening to loud music, riding motorcycles, or being around other loud machines. Hearing loss can affect your work and home life. It can make you feel lonely or depressed. You may feel that you have lost your independence. But hearing aids and other devices can help you hear better and feel connected to others. Follow-up care is a key part of your treatment and safety. Be sure to make and go to all appointments, and call your doctor if you are having problems. It's also a good idea to know your test results and keep a list of the medicines you take. How can you care for yourself at home? Avoid loud noises whenever possible. This helps keep your hearing from getting worse. Always wear hearing protection around loud noises. If appropriate, wear hearing aid(s) as directed. It is recommended that hearing aids are worn during all waking hours to keep your brain active and give it access to the sounds it is missing. If you are beginning your process with hearing aid(s), schedule a \"Hearing Aid Evaluation\" with an audiologist to discuss your lifestyle, features of hearing aid technology, and styles of hearing aids available. It is recommended that you contact your insurance company to determine if you have a hearing aid benefit, as this may dictate who you can see for these services. Have hearing tests as your doctor suggests. They can show whether your hearing has changed. Your hearing aid may need to be adjusted. Use other assistive devices as needed. These may include:  Telephone amplifiers and hearing aids that can connect to a television, stereo, radio, or microphone. Devices that use lights or vibrations. These alert you to the doorbell, a ringing telephone, or a baby monitor. Television closed-captioning. This shows the words at the bottom of the screen. Most new TVs can do this. TTY (text telephone). This lets you type messages back and forth on the telephone instead of talking or listening. These devices are also called TDD.  When messages are typed on the keyboard, they are sent over the phone line to a receiving TTY. The message is shown on a monitor. Use pagers, fax machines, text, and email if it is hard for you to communicate by telephone. Try to learn a listening technique called speech-reading. It is not lip-reading. You pay attention to people's gestures, expressions, posture, and tone of voice. These clues can help you understand what a person is saying. Face the person you are talking to, and have him or her face you. Make sure the lighting is good. You need to see the other person's face clearly. Think about counseling if you need help to adjust to your hearing loss. When should you call for help? Watch closely for changes in your health, and be sure to contact your doctor if:    You think your hearing is getting worse. You have new symptoms, such as dizziness or nausea. Dizziness: Care Instructions  Your Care Instructions  Dizziness is the feeling of unsteadiness or fuzziness in your head. It is different than having vertigo, which is a feeling that the room is spinning or that you are moving or falling. It is also different from lightheadedness, which is the feeling that you are about to faint. It can be hard to know what causes dizziness. Some people feel dizzy when they have migraine headaches. Sometimes bouts of flu can make you feel dizzy. Some medical conditions, such as heart problems or high blood pressure, can make you feel dizzy. Many medicines can cause dizziness, including medicines for high blood pressure, pain, or anxiety. If a medicine causes your symptoms, your doctor may recommend that you stop or change the medicine. If it is a problem with your heart, you may need medicine to help your heart work better. If there is no clear reason for your symptoms, your doctor may suggest watching and waiting for a while to see if the dizziness goes away on its own.     Follow-up care is a key part of your treatment and safety. Be sure to make and go to all appointments, and call your doctor if you are having problems. It's also a good idea to know your test results and keep a list of the medicines you take. How can you care for yourself at home? If your doctor recommends or prescribes medicine, take it exactly as directed. Call your doctor if you think you are having a problem with your medicine. Do not drive while you feel dizzy. Try to prevent falls. Steps you can take include:  Using nonskid mats, adding grab bars near the tub, and using night-lights. Clearing your home so that walkways are free of anything you might trip on. Letting family and friends know that you have been feeling dizzy. This will help them know how to help you. When should you call for help? Call 911 anytime you think you may need emergency care. For example, call if:    You passed out (lost consciousness). You have dizziness along with symptoms of a heart attack. These may include:  Chest pain or pressure, or a strange feeling in the chest.  Sweating. Shortness of breath. Nausea or vomiting. Pain, pressure, or a strange feeling in the back, neck, jaw, or upper belly or in one or both shoulders or arms. Lightheadedness or sudden weakness. A fast or irregular heartbeat. You have symptoms of a stroke. These may include:  Sudden numbness, tingling, weakness, or loss of movement in your face, arm, or leg, especially on only one side of your body. Sudden vision changes. Sudden trouble speaking. Sudden confusion or trouble understanding simple statements. Sudden problems with walking or balance. A sudden, severe headache that is different from past headaches. Call your doctor now or seek immediate medical care if:    You feel dizzy and have a fever, headache, or ringing in your ears. You have new or increased nausea and vomiting. Your dizziness does not go away or comes back.     Watch closely for changes in your health, and be sure to contact your doctor if:    You do not get better as expected. Where can you learn more? Go to https://chpepiceweb.adRise. org and sign in to your [employer] account. Enter Y077 in the [employer] [facility] box to learn more about \"Dizziness: Care Instructions. \"     If you do not have an account, please click on the \"Sign Up Now\" link. Current as of: September 23, 2018  Content Version: 11.9  © 4206-1035 [employer]. Care instructions adapted under license by [facility] ([facility]). If you have questions about a medical condition or this instruction, always ask your healthcare professional. [address] any warranty or liability for your use of this information.

## 2023-02-09 ENCOUNTER — OFFICE VISIT (OUTPATIENT)
Dept: ENT CLINIC | Age: 43
End: 2023-02-09
Payer: COMMERCIAL

## 2023-02-09 VITALS
BODY MASS INDEX: 44.15 KG/M2 | HEART RATE: 88 BPM | WEIGHT: 265 LBS | TEMPERATURE: 97.2 F | HEIGHT: 65 IN | SYSTOLIC BLOOD PRESSURE: 125 MMHG | DIASTOLIC BLOOD PRESSURE: 90 MMHG | RESPIRATION RATE: 16 BRPM

## 2023-02-09 DIAGNOSIS — H65.91 RIGHT OTITIS MEDIA WITH EFFUSION: ICD-10-CM

## 2023-02-09 DIAGNOSIS — H90.3 ASNHL (ASYMMETRICAL SENSORINEURAL HEARING LOSS): Primary | ICD-10-CM

## 2023-02-09 PROCEDURE — 99214 OFFICE O/P EST MOD 30 MIN: CPT | Performed by: STUDENT IN AN ORGANIZED HEALTH CARE EDUCATION/TRAINING PROGRAM

## 2023-02-09 RX ORDER — METHYLPREDNISOLONE 4 MG/1
TABLET ORAL
Qty: 1 KIT | Refills: 0 | Status: SHIPPED | OUTPATIENT
Start: 2023-02-09 | End: 2023-02-15

## 2023-02-09 ASSESSMENT — ENCOUNTER SYMPTOMS
NAUSEA: 0
SHORTNESS OF BREATH: 0
EYE PAIN: 0
RHINORRHEA: 0
COUGH: 0
VOMITING: 0

## 2023-02-09 NOTE — PROGRESS NOTES
Ro      Patient Name: Miriam Colorado  Medical Record Number:  [id number]  Primary Care Physician:  No primary care provider on file. Date of Consultation: 2/9/2023    Chief Complaint:   Chief Complaint   Patient presents with    Ear Problem     Patient states that she has been treated with 2 different antibiotics from urgent care and has not gotten no relief for her ear infection. She states she has pain in the right ear but was told she has fluid in both ears. HISTORY OF PRESENT ILLNESS  Lola is a(n) 44 y.o. female who presents for evaluation of hearing loss and vertigo. She states that this has been going on for 5 to 6 months. The hearing and dizziness both can have began out of nowhere. The dizziness is slightly improved over the last several days and is only occurring every 2 to 3 days. She has a sense of motion that can last for minutes to hours. She denies any sensitivity to salt. She denies any family history of Ménière's disease. She never had any ear surgeries. She had any recent head neck imaging    Interval History 8/9/2022  Lola felt that the hearing improved for a day or 2 after the injection was performed. It has since returned to normal.  She has not had her MRI performed yet. Interval history 2/9/2023  Lola has been having pain and discomfort in the right ear and has been on 2 rounds of antibiotics with continued discomfort. She states that the left ear has become more clear but still feels that the hearing is decreased on that side. She has not gotten her MRI but she did just get insurance coverage again. There is no problem list on file for this patient. No past surgical history on file. No family history on file.   Social History     Socioeconomic History    Marital status: Single     Spouse name: Not on file    Number of children: Not on file    Years of education: Not on file Highest education level: Not on file   Occupational History    Not on file   Tobacco Use    Smoking status: Every Day     Packs/day: 0.50     Types: Cigarettes    Smokeless tobacco: Never   Vaping Use    Vaping Use: Never used   Substance and Sexual Activity    Alcohol use: Never    Drug use: Never    Sexual activity: Not on file   Other Topics Concern    Not on file   Social History Narrative    Not on file     Social Determinants of Health     Financial Resource Strain: Not on file   Food Insecurity: Not on file   Transportation Needs: Not on file   Physical Activity: Not on file   Stress: Not on file   Social Connections: Not on file   Intimate Partner Violence: Not on file   Housing Stability: Not on file       DRUG/FOOD ALLERGIES: Amlodipine and Hydrocodone-acetaminophen    CURRENT MEDICATIONS  Prior to Admission medications    Medication Sig Start Date End Date Taking? Authorizing Provider   methylPREDNISolone (MEDROL DOSEPACK) 4 MG tablet Take by mouth. 2/9/23 2/15/23 Yes Crow Kaur DO   glipiZIDE (GLUCOTROL) 10 MG tablet Take 10 mg by mouth in the morning and 10 mg in the evening. Take before meals. Yes Historical Provider, MD   aspirin 81 MG EC tablet Take 81 mg by mouth in the morning.    Yes Historical Provider, MD   metFORMIN (GLUCOPHAGE) 1000 MG tablet Take 1,000 mg by mouth 2 times daily (with meals)   Yes Historical Provider, MD   levothyroxine (SYNTHROID) 200 MCG tablet Take 300 mcg by mouth Daily   Yes Historical Provider, MD   furosemide (LASIX) 20 MG tablet Take 20 mg by mouth daily   Yes Historical Provider, MD   metoclopramide (REGLAN) 10 MG tablet Take 1 tablet by mouth 3 times daily as needed (nausea/dizziness) 5/13/22  Yes Consuelo Araujo MD   lisinopril-hydroCHLOROthiazide (PRINZIDE;ZESTORETIC) 20-25 MG per tablet Take 1 tablet by mouth daily Indications: dose is 40/20  Patient not taking: Reported on 2/9/2023    Historical Provider, MD   buPROPion [facility]) 150 MG extended release tablet Take 150 mg by mouth in the morning and 150 mg before bedtime. Patient not taking: Reported on 2/9/2023    Historical Provider, MD       REVIEW OF SYSTEMS  The following systems were reviewed and revealed the following in addition to any already discussed in the HPI:    Review of Systems   Constitutional:  Negative for fatigue and fever. HENT:  Positive for ear pain and hearing loss. Negative for congestion, postnasal drip, rhinorrhea and sneezing. Eyes:  Negative for pain and visual disturbance. Respiratory:  Negative for cough and shortness of breath. Cardiovascular:  Negative for chest pain. Gastrointestinal:  Negative for nausea and vomiting. Endocrine: Negative. Genitourinary: Negative. Musculoskeletal:  Negative for neck pain and neck stiffness. Skin:  Negative for rash. Neurological:  Negative for dizziness and headaches. PHYSICAL EXAM  BP (!) 125/90 (Site: Right Lower Arm, Position: Sitting, Cuff Size: Medium Adult)   Pulse 88   Temp 97.2 °F (36.2 °C) (Infrared)   Resp 16   Ht 5' 5\" (1.651 m)   Wt 265 lb (120.2 kg)   BMI 44.10 kg/m²     GENERAL: No Acute Distress, Alert and Oriented, no hoarseness  EYES: EOMI, Anti-icteric  NOSE: No epistaxis, nasal mucosa within normal limits, no purulent drainage  EARS: Normal external canal appearance, EAC patent bilaterally, TMs intact bilaterally with effusion on the right  FACE: 1/6 House-Brackmann Scale, symmetric, sensation equal bilaterally  ORAL CAVITY: No masses or lesions palpated, uvula is midline, moist mucous membranes,   NECK: Normal range of motion, no thyromegaly, trachea is midline, no lymphadenopathy, no neck masses, no crepitus  CHEST: Normal respiratory effort, no retractions, breathing comfortably  SKIN: No rashes, normal appearing skin, no evidence of skin lesions/tumors    RADIOLOGY  Summary of findings:    PROCEDURE    ASSESSMENT/PLAN  Lola is a very pleasant 44 y.o. female with     1.  Right otitis media with effusion  She has been on 2 rounds of antibiotics and the ears do not appear to be infected today. We will give her Medrol Dosepak to help encourage return of normal eustachian tube function and hopefully clear the effusion.  - methylPREDNISolone (MEDROL DOSEPACK) 4 MG tablet; Take by mouth. Dispense: 1 kit; Refill: 0    2. ASNHL (asymmetrical sensorineural hearing loss)  She has not gotten her MRI nor has she had a recent hearing test.  We will treat her most recent eustachian tube dysfunction and ear infection and obtain a hearing test in 3 to 4 weeks to establish a new baseline        Medical Decision Making:   The following items were considered in medical decision making:  Independent review of images  Review / order clinical lab tests  Review / order radiology tests  Decision to obtain old records

## 2023-03-23 ENCOUNTER — PROCEDURE VISIT (OUTPATIENT)
Dept: AUDIOLOGY | Age: 43
End: 2023-03-23

## 2023-03-23 DIAGNOSIS — H92.03 OTALGIA, BILATERAL: ICD-10-CM

## 2023-03-23 DIAGNOSIS — H93.12 TINNITUS, LEFT: ICD-10-CM

## 2023-03-23 DIAGNOSIS — H90.42 SENSORINEURAL HEARING LOSS (SNHL) OF LEFT EAR WITH UNRESTRICTED HEARING OF RIGHT EAR: Primary | ICD-10-CM

## 2023-03-23 DIAGNOSIS — H93.8X3 EAR PRESSURE, BILATERAL: ICD-10-CM

## 2023-03-23 DIAGNOSIS — R42 DIZZINESS AND GIDDINESS: ICD-10-CM

## 2023-03-23 NOTE — PROGRESS NOTES
Markus Melgar   1980, 44 y.o. female   [id number]       Referring Provider: Claudean Grout, DO  Referral Type: In an order in [address] [place] [address]    Reason for Visit: Evaluation of suspected change in hearing, tinnitus, or balance. ADULT AUDIOLOGIC EVALUATION      Chastity Edmonia Meigs is a 44 y.o. female seen today, 3/23/2023 , for a recheck audiologic evaluation. Patient was alone. AUDIOLOGIC AND OTHER PERTINENT MEDICAL HISTORY:      Markus Melgar noted otalgia, aural fullness, tinnitus, and dizziness. Patient reports bilateral aural fullness, left worse than right, and intermittent bilateral otalgia. She noted having left ear tinnitus in the morning and then improves through out the day. Patient has experienced dizziness usually occurring with ear infections. Previous audiologic evaluation was completed on 08/11/2022. Date: 3/23/2023     IMPRESSIONS:      Normal middle ear pressure and compliance, bilaterally. Abnormal asymmetric hearing sensitivity, left worse than right, which can affect every day listening needs. Word understanding was poor in the left ear and excellent in the right ear presented at elevated sensation levels. Hearing loss has remained stable since previous audiologic evaluation. Follow medical recommendations of Crow Kaur DO.     ASSESSMENT AND FINDINGS:     Otoscopy revealed: Clear ear canals bilaterally    RIGHT EAR:  Hearing Sensitivity:Hearing sensitivity within normal limits from 250-8000 Hz  Speech Recognition Threshold: 10 dB HL  Word Recognition:Excellent (100%), based on NU-6 25-word list at 50 dBHL using recorded speech stimuli. Tympanometry: Normal peak pressure and compliance, Type A tympanogram, consistent with normal middle ear function.  (Results did not save in computer)      LEFT EAR:  Hearing Sensitivity: Moderately severe rising to a mild sensorineural hearing loss from 250-8000 Hz  Speech Recognition Threshold: 35 dB HL  Word Recognition: Poor (68%), based on NU-6

## 2023-03-24 ENCOUNTER — TELEPHONE (OUTPATIENT)
Dept: ENT CLINIC | Age: 43
End: 2023-03-24

## 2023-03-24 NOTE — TELEPHONE ENCOUNTER
Patient called [facility] yes was seen on 3/23/23 for a HT and was told there was no fluid in her ears but still feels as if there is something running down the inside of her ear. No drainage from outside. Patient believes this is causing her dizziness and would like a prescription if possible. Please advise.

## 2023-03-27 NOTE — TELEPHONE ENCOUNTER
Patient states that she was seen by her PCP and prescribed an antibiotic and steroids for fluid in her ears. She states that she is going to take these medications and will call back for an appointment if needed.

## 2023-04-22 ENCOUNTER — HOSPITAL ENCOUNTER (OUTPATIENT)
Age: 43
Discharge: HOME | End: 2023-04-22
Payer: COMMERCIAL

## 2023-04-22 LAB
25(OH)D3 SERPL-MCNC: 10.2 NG/ML
ALBUMIN SERPL-MCNC: 3.8 G/DL (ref 3.4–5)
ALBUMIN/GLOB SERPL: 1.1 {RATIO} (ref 1.1–2.2)
ALP SERPL-CCNC: 89 U/L (ref 40–129)
ALT SERPL-CCNC: 16 U/L (ref 10–40)
ANION GAP SERPL CALCULATED.3IONS-SCNC: 14 MMOL/L (ref 3–16)
AST SERPL-CCNC: 16 U/L (ref 15–37)
BASOPHILS # BLD: 0.4 K/UL (ref 0–0.2)
BASOPHILS NFR BLD: 2.1 %
BILIRUB SERPL-MCNC: 0.3 MG/DL (ref 0–1)
BUN SERPL-MCNC: 24 MG/DL (ref 7–20)
CALCIUM SERPL-MCNC: 9.2 MG/DL (ref 8.3–10.6)
CHLORIDE SERPL-SCNC: 97 MMOL/L (ref 99–110)
CHOLEST SERPL-MCNC: 326 MG/DL (ref 0–199)
CO2 SERPL-SCNC: 19 MMOL/L (ref 21–32)
CREAT SERPL-MCNC: 0.8 MG/DL (ref 0.6–1.1)
DEPRECATED RDW RBC AUTO: 15.8 % (ref 12.4–15.4)
EOSINOPHIL # BLD: 0.5 K/UL (ref 0–0.6)
EOSINOPHIL NFR BLD: 2.6 %
GFR SERPLBLD CREATININE-BSD FMLA CKD-EPI: >60 ML/MIN/{1.73_M2}
GLUCOSE SERPL-MCNC: 165 MG/DL (ref 70–99)
HCT VFR BLD AUTO: 44.4 % (ref 36–48)
HDLC SERPL-MCNC: 48 MG/DL (ref 40–60)
HGB BLD-MCNC: 14.3 G/DL (ref 12–16)
LDLC SERPL CALC-MCNC: 229 MG/DL
LYMPHOCYTES # BLD: 6 K/UL (ref 1–5.1)
LYMPHOCYTES NFR BLD: 32.9 %
MAGNESIUM SERPL-MCNC: 2.3 MG/DL (ref 1.8–2.4)
MCH RBC QN AUTO: 29.6 PG (ref 26–34)
MCHC RBC AUTO-ENTMCNC: 32.2 G/DL (ref 31–36)
MCV RBC AUTO: 91.8 FL (ref 80–100)
MONOCYTES # BLD: 0.9 K/UL (ref 0–1.3)
MONOCYTES NFR BLD: 4.7 %
NEUTROPHILS # BLD: 10.4 K/UL (ref 1.7–7.7)
NEUTROPHILS NFR BLD: 57.7 %
PLATELET # BLD AUTO: 349 K/UL (ref 135–450)
PMV BLD AUTO: 9.5 FL (ref 5–10.5)
POTASSIUM SERPL-SCNC: 4.7 MMOL/L (ref 3.5–5.1)
PROT SERPL-MCNC: 7.4 G/DL (ref 6.4–8.2)
RBC # BLD AUTO: 4.83 M/UL (ref 4–5.2)
SODIUM SERPL-SCNC: 130 MMOL/L (ref 136–145)
TRIGL SERPL-MCNC: 246 MG/DL (ref 0–150)
TSH SERPL DL<=0.005 MIU/L-ACNC: 58.27 UIU/ML (ref 0.27–4.2)
VLDLC SERPL CALC-MCNC: 49 MG/DL
WBC # BLD AUTO: 18.1 K/UL (ref 4–11)

## 2023-04-22 PROCEDURE — 83036 HEMOGLOBIN GLYCOSYLATED A1C: CPT

## 2023-04-22 PROCEDURE — 83735 ASSAY OF MAGNESIUM: CPT

## 2023-04-22 PROCEDURE — 36415 COLL VENOUS BLD VENIPUNCTURE: CPT

## 2023-04-22 PROCEDURE — 85025 COMPLETE CBC W/AUTO DIFF WBC: CPT

## 2023-04-22 PROCEDURE — 82306 VITAMIN D 25 HYDROXY: CPT

## 2023-04-22 PROCEDURE — 84443 ASSAY THYROID STIM HORMONE: CPT

## 2023-04-22 PROCEDURE — 80061 LIPID PANEL: CPT

## 2023-04-22 PROCEDURE — 80053 COMPREHEN METABOLIC PANEL: CPT

## 2023-04-24 LAB
EST. AVERAGE GLUCOSE BLD GHB EST-MCNC: 217.3 MG/DL
HBA1C MFR BLD: 9.2 %

## 2024-01-01 ENCOUNTER — HOSPITAL ENCOUNTER (EMERGENCY)
Age: 44
Discharge: HOME | End: 2024-01-01
Attending: EMERGENCY MEDICINE
Payer: COMMERCIAL

## 2024-01-01 ENCOUNTER — APPOINTMENT (OUTPATIENT)
Dept: GENERAL RADIOLOGY | Age: 44
End: 2024-01-01
Payer: COMMERCIAL

## 2024-01-01 VITALS
RESPIRATION RATE: 16 BRPM | HEIGHT: 64 IN | BODY MASS INDEX: 45.24 KG/M2 | DIASTOLIC BLOOD PRESSURE: 98 MMHG | HEART RATE: 80 BPM | OXYGEN SATURATION: 96 % | SYSTOLIC BLOOD PRESSURE: 148 MMHG | WEIGHT: 265 LBS | TEMPERATURE: 97.6 F

## 2024-01-01 DIAGNOSIS — S83.412A SPRAIN OF MEDIAL COLLATERAL LIGAMENT OF LEFT KNEE, INITIAL ENCOUNTER: Primary | ICD-10-CM

## 2024-01-01 PROCEDURE — 99283 EMERGENCY DEPT VISIT LOW MDM: CPT

## 2024-01-01 PROCEDURE — 6370000000 HC RX 637 (ALT 250 FOR IP): Performed by: EMERGENCY MEDICINE

## 2024-01-01 PROCEDURE — 73562 X-RAY EXAM OF KNEE 3: CPT

## 2024-01-01 RX ORDER — IBUPROFEN 600 MG/1
600 TABLET ORAL EVERY 6 HOURS PRN
Qty: 30 TABLET | Refills: 0 | Status: SHIPPED | OUTPATIENT
Start: 2024-01-01

## 2024-01-01 RX ORDER — ACETAMINOPHEN 500 MG
1000 TABLET ORAL ONCE
Status: COMPLETED | OUTPATIENT
Start: 2024-01-01 | End: 2024-01-01

## 2024-01-01 RX ORDER — LIDOCAINE 50 MG/G
1 PATCH TOPICAL DAILY
Qty: 10 PATCH | Refills: 0 | Status: SHIPPED | OUTPATIENT
Start: 2024-01-01 | End: 2024-01-11

## 2024-01-01 RX ORDER — IBUPROFEN 600 MG/1
600 TABLET ORAL
Status: DISCONTINUED | OUTPATIENT
Start: 2024-01-01 | End: 2024-01-02 | Stop reason: HOSPADM

## 2024-01-01 RX ADMIN — ACETAMINOPHEN 1000 MG: 500 TABLET ORAL at 23:34

## 2024-01-01 ASSESSMENT — PAIN - FUNCTIONAL ASSESSMENT: PAIN_FUNCTIONAL_ASSESSMENT: 0-10

## 2024-01-01 ASSESSMENT — PAIN SCALES - GENERAL: PAINLEVEL_OUTOF10: 10

## 2024-01-02 NOTE — ED PROVIDER NOTES
[facility] ED  EMERGENCY DEPARTMENT ENCOUNTER      Pt Name: Lola Rivera  MRN: [id number]  Birthdate 1980  Date of evaluation: 1/1/2024  Provider: Candy Key MD    CHIEF COMPLAINT       Chief Complaint   Patient presents with    Knee Pain     Left knee pain after fall down steps this morning. No other injury, denies hitting head or LOC.          HISTORY OF PRESENT ILLNESS   (Location/Symptom, Timing/Onset, Context/Setting, Quality, Duration, Modifying Factors, Severity)  Note limiting factors.   Lola Rivera is a 43 y.o. female who presents to the emergency department after her left leg twisted in the banister on the steps this morning.  She did not fall or hit her head on initially she did not have any complaints but then over the course of about 3 hours she began develop pain and now limited range of motion as well as pain with ambulation and weightbearing.  She was without complaint immediately after the event.  No prior injury to the left knee.  No weakness, numbness, dizziness.  No chest pain or shortness of breath.        Nursing Notes were reviewed.    REVIEW OF SYSTEMS    (2-9 systems for level 4, 10 or more for level 5)     As per HPI    Except as noted above the remainder of the review of systems was reviewed and negative.       PAST MEDICAL HISTORY     Past Medical History:   Diagnosis Date    Depression     Diabetes mellitus (HCC)     Hypertension     Thyroid disease          SURGICAL HISTORY     History reviewed. No pertinent surgical history.      CURRENT MEDICATIONS       Previous Medications    ASPIRIN 81 MG EC TABLET    Take 81 mg by mouth in the morning.    BUPROPION (WELLBUTRIN SR) 150 MG EXTENDED RELEASE TABLET    Take 150 mg by mouth in the morning and 150 mg before bedtime.    FUROSEMIDE (LASIX) 20 MG TABLET    Take 20 mg by mouth daily    GLIPIZIDE (GLUCOTROL) 10 MG TABLET    Take 10 mg by mouth in the morning and 10 mg in the evening. Take before meals.